# Patient Record
(demographics unavailable — no encounter records)

---

## 2024-10-14 NOTE — PHYSICAL EXAM
[General Appearance - Alert] : alert [General Appearance - In No Acute Distress] : in no acute distress [Sclera] : the sclera and conjunctiva were normal [Outer Ear] : the ears and nose were normal in appearance [Neck Appearance] : the appearance of the neck was normal [Neck Cervical Mass (___cm)] : no neck mass was observed [Jugular Venous Distention Increased] : there was no jugular-venous distention [] : no respiratory distress [Exaggerated Use Of Accessory Muscles For Inspiration] : no accessory muscle use [Apical Impulse] : the apical impulse was normal [Heart Rate And Rhythm] : heart rate was normal and rhythm regular [Heart Sounds] : normal S1 and S2 [Edema] : there was no peripheral edema [Bowel Sounds] : normal bowel sounds [Abdomen Soft] : soft [Abdomen Tenderness] : non-tender [Cervical Lymph Nodes Enlarged Posterior Bilaterally] : posterior cervical [Cervical Lymph Nodes Enlarged Anterior Bilaterally] : anterior cervical [Supraclavicular Lymph Nodes Enlarged Bilaterally] : supraclavicular [No CVA Tenderness] : no ~M costovertebral angle tenderness [No Spinal Tenderness] : no spinal tenderness [Abnormal Walk] : normal gait [Nail Clubbing] : no clubbing  or cyanosis of the fingernails [Musculoskeletal - Swelling] : no joint swelling seen [Skin Color & Pigmentation] : normal skin color and pigmentation [Oriented To Time, Place, And Person] : oriented to person, place, and time

## 2024-10-14 NOTE — END OF VISIT
Talked to patient she will be in to have lab sample redrawn.   
[Time Spent: ___ minutes] : I have spent [unfilled] minutes of time on the encounter which excludes teaching and separately reported services.
[Time Spent: ___ minutes] : I have spent [unfilled] minutes of time on the encounter which excludes teaching and separately reported services.

## 2024-10-17 NOTE — HISTORY OF PRESENT ILLNESS
[FreeTextEntry1] : A case of chronic kidney disease in the setting hypertension, diabetes mellitus, hepatitis, and right atrophied kidney with cyst in both the kidneys. Patient is being evaluated for CKD.

## 2024-10-17 NOTE — ASSESSMENT
[FreeTextEntry1] : A case of chronic kidney disease in the setting hypertension, diabetes mellitus, hepatitis, and right atrophied kidney with cyst in both the kidneys. Patient is being evaluated for CKD. Weight is stable. BP is controlled. No pedal edema. Renal duplex showed right atrophied kidney with bilateral simple cyst. Cause ol CKD likely nephrosclerosis related to ageing, hypertension and diabetes in addition to atrophied right kidney. However, other caused to be excluded. Advised serological and immunological w/u. Lab results were discussed with the patient. Renal functions were stable. A1C 6.3%. Serological and immunological markers are within an acceptable range. Patient is on an ACE inhibitor and a SGLT2 inhibitor. Advised to continue the present regimen. RTC one year.

## 2024-10-17 NOTE — REVIEW OF SYSTEMS
[Nocturia] : nocturia [Negative] : ENT [Recent Weight Gain (___ Lbs)] : no recent weight gain [Recent Weight Loss (___ Lbs)] : no recent weight loss [Chest Pain] : no chest pain [Palpitations] : no palpitations [Lower Ext Edema] : no extremity edema [SOB on Exertion] : no shortness of breath during exertion [Constipation] : no constipation [Diarrhea] : no diarrhea [Heartburn] : no heartburn [Joint Swelling] : no joint swelling [Joint Stiffness] : no joint stiffness [Dizziness] : no dizziness [Fainting] : no fainting [Anxiety] : no anxiety [Depression] : no depression [Muscle Weakness] : no muscle weakness [Easy Bleeding] : no tendency for easy bleeding [Easy Bruising] : no tendency for easy bruising

## 2024-11-23 NOTE — END OF VISIT
[] : Fellow [FreeTextEntry3] : 59 yo MSM with overweight BMI, hypertension, dyslipidemia, IDDM2, CAD (s/p stents x2 in 2016), PAD (s/p stents in 2/2024), chronic R kidney atrophy, metabolic dysfunction-associated steatotic liver disease (MASLD), and recent hospitalization at [facility] from 8/21/24-8/29/24 and then transferred to [facility] 8/29/24-9/3/24 due to a severe acute liver injury and hyperbilirubinemia due to acute HBV infection (vs less likely HBV reactivation/flare, with HBsAg+, HBcIgM+, HBeAg+, and peak HBV PCR 4,743,319 IU/mL on 8/24/24). No HIV, HCV, or HDV coinfection. Inpatient labs with weakly positive ASMA with 1:20 -> 1:40 titer unlikely of any clinical significance. Recent MRI abdomen (10/7/24) with no cirrhosis or HCC. As an inpatient, he was started on antiviral therapy initially with TDF and currently on entecavir 0.5 mg po daily. Most recent labs (10/7/24) with normalized liver chemistries and continued improvement in HBV PCR to 835 IU/mL. He will continue antiviral therapy and we will re-check labs and imaging in 6 months. Prior FibroScan (9/13/24) with median liver stiffness 11.1 kPa in range of advanced (F3) fibrosis but may be an overestimate of hepatic fibrosis due to the acute liver injury at the time and therefore we will plan to repeat it in 6 months with his next visit for re-staging, including to ascertain whether he would also benefit from targeted pharmacotherapy for MASLD in addition to ongoing HBV treatment. He is HAV immune. We discussed condom use given viremia.

## 2024-11-23 NOTE — HISTORY OF PRESENT ILLNESS
[FreeTextEntry1] : RFR: Post hospitalization visit, acute HBV  Referring MD: [facility]  Cardiologist: Dr. Byron Trevizo  Endo/PCP: Dr. Garay    Mr. Angie North is a 60y M who presents today to establish hepatology care following a hospitalization for new, acute HBV infection. Additional PMH of DM2 (~8.5% most recently), HTN, HLD, CAD (s/p PCI x2 2016), PAD -- (s/p angioplasty w/ stenting 2/2024 -- on ASA/plavix).   Pt has hx of vomiting a few years ago, for which he underwent an EGD and was found to have gastritis, which resolved w/ meds. Began having nausea/vomiting which started two days after recent travel to [place] 5/23/2024- 6/1/24. Pt is without remarkable source of exposure as he did not engage in sexual activity, consume raw seafood, or sustain a needlestick exposure. Continued to feel gradually unwell so presented to [facility] 8/21 w/ c/o N/V, dark urine & stools, found to have acute HBV - started on tenofovir but subsequently tx to [facility] 8/29/24 for further management d/t persistent transaminitis of AST/ALT >1000 & elevated TB. Admitted to [facility] 8/29/24 - 9/3/24 for continued monitoring in the event of HARRIET.   8/22/24 EGD @  (report unavailable): MW Tear noted   US ABD/ Doppler 9/3/24:  1. Hepatomegaly. The liver parenchyma demonstrates diffusely increased and heterogeneous echogenicity compatible with hepatic steatosis. The  hepatic vasculature is patent, as detailed above. 2. Status post cholecystectomy. No biliary dilatation. 3. Mild splenomegaly. ... also w/ incidental finding of R. kidney atrophy   Serologies 8/29: HBcIgM +, HBsAg +. HBeAg +, HB PCR 06965 (millions @ OSH) HAV IgG +, IgM - HCV nr  HDV IgM nr, total Ab neg  9/1/24 Quant IgG 1172, IgA 195, IgM + 390  CHOCO negative, SMA + 1:40, AMA neg < 1:20  AFP 10.5 **  Downtrending transaminases at time of DC  9/2/24: AST//1197; ALKP 482, TB 4 9/3/24: AST//1047; ALKP 447, TB 3.6, Plt 202   Outpt labs 9/10/24: AST/ALT 59/218; ALKP 273, TB 2.3, HepB PCR quant 920 (8/29/24 05698)   Of note, [facility] inpt notes state pt was started on TDF, however pt was started on entecavir upon tx to [facility] (unclear reason)  Remains compliant w/ entecavir 0.5 mg  Prefers med refills from [facility] as current  Had isolated episode of vomiting 9/6, but none since.   Today, denies n/v/d, fevers, chills, excessive fatigue, chest pain, SOB, palpitations, lightheadedness/ dizziness, melena, unintentional weight loss, acute hospitalizations or ER visits. Afebrile today. Appetite is good, eating 3 meals/daily, working on portion control.   [Other Medical Hx] See HPI   [Surgical Hx] Cholecystectomy 90s, sinus surgery for tumor removal 90s    [Allergies] NKDA   [Medications] ASA 81 mg  Entecavir 0.5 mg daily  Janumet 50mg -1000 mg BID  Jardiance 25 mg daily Glimeperide 4 mg BID   Newly initiated on insulin 15 units ~ 8/2024 daily  Lisinopril-hctz 20 mg-12.5 mg daily  Metoprolol 25 mg daily Plavix 75 mg daily    [FmH of liver disease] Denies    [Socia/l Hx] - Occupation:   - Alcohol: Previously social drinker, quit since 2016 after cardiac episode  - Tobacco: Ex- smoker 1/2 ppd, quit 2016  - Illicit drug: Denies     - Herb and dietary Supplement: Denies    [Procedures] EGD/COLONOSCOPY: 4 years ago w/ GI, was told of mild gastritis, but otherwise normal results, repeat in 5 years. Records unavailable   INTERVAL HISTORY  Nov 22, 2024 Patient is here for routine follow up after starting Entecavir 0.5mg daily. Overall, he feels well, having good energy levels, preserved appetite and regular bowel movements. Compliant with his medications and tolerating them well. Blood work from Oct 2024 was reviewed. Patient with preserved liver synthetic function and normalization of LFTs. HCV PCR neg I HBV DNA pos I HBsAb neg I HBsAg pos I CHOCO neg. MRI (10/9/2024) negative for HCC. Patient was scheduled for repeat Fibroscan today, but it was cancelled and we will monitor on next appointment.

## 2024-11-23 NOTE — HISTORY OF PRESENT ILLNESS
[FreeTextEntry1] : RFR: Post hospitalization visit, acute HBV  Referring MD: [facility]  Cardiologist: Dr. Byron Trevizo  Endo/PCP: Dr. Garay    Mr. Angie North is a 60y M who presents today to establish hepatology care following a hospitalization for new, acute HBV infection. Additional PMH of DM2 (~8.5% most recently), HTN, HLD, CAD (s/p PCI x2 2016), PAD -- (s/p angioplasty w/ stenting 2/2024 -- on ASA/plavix).   Pt has hx of vomiting a few years ago, for which he underwent an EGD and was found to have gastritis, which resolved w/ meds. Began having nausea/vomiting which started two days after recent travel to [place] 5/23/2024- 6/1/24. Pt is without remarkable source of exposure as he did not engage in sexual activity, consume raw seafood, or sustain a needlestick exposure. Continued to feel gradually unwell so presented to [facility] 8/21 w/ c/o N/V, dark urine & stools, found to have acute HBV - started on tenofovir but subsequently tx to [facility] 8/29/24 for further management d/t persistent transaminitis of AST/ALT >1000 & elevated TB. Admitted to [facility] 8/29/24 - 9/3/24 for continued monitoring in the event of HARRIET.   8/22/24 EGD @  (report unavailable): MW Tear noted   US ABD/ Doppler 9/3/24:  1. Hepatomegaly. The liver parenchyma demonstrates diffusely increased and heterogeneous echogenicity compatible with hepatic steatosis. The  hepatic vasculature is patent, as detailed above. 2. Status post cholecystectomy. No biliary dilatation. 3. Mild splenomegaly. ... also w/ incidental finding of R. kidney atrophy   Serologies 8/29: HBcIgM +, HBsAg +. HBeAg +, HB PCR 18628 (millions @ OSH) HAV IgG +, IgM - HCV nr  HDV IgM nr, total Ab neg  9/1/24 Quant IgG 1172, IgA 195, IgM + 390  CHOCO negative, SMA + 1:40, AMA neg < 1:20  AFP 10.5 **  Downtrending transaminases at time of DC  9/2/24: AST//1197; ALKP 482, TB 4 9/3/24: AST//1047; ALKP 447, TB 3.6, Plt 202   Outpt labs 9/10/24: AST/ALT 59/218; ALKP 273, TB 2.3, HepB PCR quant 920 (8/29/24 73042)   Of note, [facility] inpt notes state pt was started on TDF, however pt was started on entecavir upon tx to [facility] (unclear reason)  Remains compliant w/ entecavir 0.5 mg  Prefers med refills from [facility] as current  Had isolated episode of vomiting 9/6, but none since.   Today, denies n/v/d, fevers, chills, excessive fatigue, chest pain, SOB, palpitations, lightheadedness/ dizziness, melena, unintentional weight loss, acute hospitalizations or ER visits. Afebrile today. Appetite is good, eating 3 meals/daily, working on portion control.   [Other Medical Hx] See HPI   [Surgical Hx] Cholecystectomy 90s, sinus surgery for tumor removal 90s    [Allergies] NKDA   [Medications] ASA 81 mg  Entecavir 0.5 mg daily  Janumet 50mg -1000 mg BID  Jardiance 25 mg daily Glimeperide 4 mg BID   Newly initiated on insulin 15 units ~ 8/2024 daily  Lisinopril-hctz 20 mg-12.5 mg daily  Metoprolol 25 mg daily Plavix 75 mg daily    [FmH of liver disease] Denies    [Socia/l Hx] - Occupation:   - Alcohol: Previously social drinker, quit since 2016 after cardiac episode  - Tobacco: Ex- smoker 1/2 ppd, quit 2016  - Illicit drug: Denies     - Herb and dietary Supplement: Denies    [Procedures] EGD/COLONOSCOPY: 4 years ago w/ GI, was told of mild gastritis, but otherwise normal results, repeat in 5 years. Records unavailable   INTERVAL HISTORY  Nov 22, 2024 Patient is here for routine follow up after starting Entecavir 0.5mg daily. Overall, he feels well, having good energy levels, preserved appetite and regular bowel movements. Compliant with his medications and tolerating them well. Blood work from Oct 2024 was reviewed. Patient with preserved liver synthetic function and normalization of LFTs. HCV PCR neg I HBV DNA pos I HBsAb neg I HBsAg pos I CHOCO neg. MRI (10/9/2024) negative for HCC. Patient was scheduled for repeat Fibroscan today, but it was cancelled and we will monitor on next appointment.

## 2024-11-23 NOTE — ASSESSMENT
[FreeTextEntry1] : Mr. Angie North is a 60y M, post hospitalization for acute HBV of unclear etiology. Pt also likely w/ probable MASLD given + risk factors HTN, HLD, DM2, CAD hx of PCI on DAPT, PAD  # Acute HBV  - Compliant with Entecavir 0.5 mg daily. Well tolerated. - On blood work from Oct 2024, preserved liver synthetic function and normalization of LFTs.  - Fibroscan (9/13/24) inconclusive d/t current inflammatory process. [S1 - F3]   - MRI (10/9/2024) no cirrhotic morphology.  - Pt education provided on Hep B, including minimizing transmission risk. Encouraged pt to disclose acute HBV status and patient shares he has already shared this information with his loved ones.  - Pt counseled on his risk factors for MASH/MASLD and counseled on healthy lifestyle modifications including healthy diet and exercise with PCP/endocrinologist regular follow-up.  - Also counseled on avoiding alcohol, herbs and supplements.   # HCC Screening  - MRI (10/9/2024) negative for HCC. AFP (9/1/24) 10.5 - Next due in April 2025.  - Importance of HCC screening provided to patient.   [Plan] - Continue entecavir 0.5mg daily  - Repeat MELD labs, HBV DNA/VL, HBe Ag/Ab and AFP ordered  - Monitor Abdominal US for HCC screening  - Repeat Fibroscan to be done in next appt.  - RTO in 6 months after completion of the above

## 2024-11-23 NOTE — PHYSICAL EXAM
[Non-Tender] : non-tender [Smooth] : smooth [General Appearance - Alert] : alert [General Appearance - In No Acute Distress] : in no acute distress [General Appearance - Well Nourished] : well nourished [General Appearance - Well Developed] : well developed [General Appearance - Well-Appearing] : healthy appearing [Sclera] : the sclera and conjunctiva were normal [Hearing Threshold Finger Rub Not [place]] : hearing was normal [Oropharynx] : the oropharynx was normal [Neck Appearance] : the appearance of the neck was normal [Neck Cervical Mass (___cm)] : no neck mass was observed [Jugular Venous Distention Increased] : there was no jugular-venous distention [Respiration, Rhythm And Depth] : normal respiratory rhythm and effort [Exaggerated Use Of Accessory Muscles For Inspiration] : no accessory muscle use [Auscultation Breath Sounds / Voice Sounds] : lungs were clear to auscultation bilaterally [Heart Rate And Rhythm] : heart rate was normal and rhythm regular [Heart Sounds] : normal S1 and S2 [Murmurs] : no murmurs [Edema] : there was no peripheral edema [Bowel Sounds] : normal bowel sounds [Abdomen Soft] : soft [Abdomen Tenderness] : non-tender [Abdomen Mass (___ Cm)] : no abdominal mass palpated [Abnormal Walk] : normal gait [Nail Clubbing] : no clubbing  or cyanosis of the fingernails [Involuntary Movements] : no involuntary movements were seen [Skin Color & Pigmentation] : normal skin color and pigmentation [Skin Turgor] : normal skin turgor [] : no rash [No Focal Deficits] : no focal deficits [Oriented To Time, Place, And Person] : oriented to person, place, and time [Impaired Insight] : insight and judgment were intact [Affect] : the affect was normal [Mood] : the mood was normal [Memory Recent] : recent memory was not impaired [Memory Remote] : remote memory was not impaired [Scleral Icterus] : No Scleral Icterus [Spider Angioma] : No spider angioma(s) were observed [Abdominal  Ascites] : no ascites [Splenomegaly] : no splenomegaly [Caput Medusae] : no caput medusae observed [Asterixis] : no asterixis observed [Jaundice] : No jaundice [Palmar Erythema] : no Palmar Erythema

## 2024-11-23 NOTE — PHYSICAL EXAM
[Non-Tender] : non-tender [Smooth] : smooth [General Appearance - Alert] : alert [General Appearance - In No Acute Distress] : in no acute distress [General Appearance - Well Nourished] : well nourished [General Appearance - Well Developed] : well developed [General Appearance - Well-Appearing] : healthy appearing [Sclera] : the sclera and conjunctiva were normal [Hearing Threshold Finger Rub Not [place]] : hearing was normal [Oropharynx] : the oropharynx was normal [Neck Appearance] : the appearance of the neck was normal [Neck Cervical Mass (___cm)] : no neck mass was observed [Jugular Venous Distention Increased] : there was no jugular-venous distention [Respiration, Rhythm And Depth] : normal respiratory rhythm and effort [Exaggerated Use Of Accessory Muscles For Inspiration] : no accessory muscle use [Auscultation Breath Sounds / Voice Sounds] : lungs were clear to auscultation bilaterally [Heart Rate And Rhythm] : heart rate was normal and rhythm regular [Heart Sounds] : normal S1 and S2 [Murmurs] : no murmurs [Edema] : there was no peripheral edema [Bowel Sounds] : normal bowel sounds [Abdomen Soft] : soft [Abdomen Tenderness] : non-tender [Abdomen Mass (___ Cm)] : no abdominal mass palpated [Abnormal Walk] : normal gait [Nail Clubbing] : no clubbing  or cyanosis of the fingernails [Involuntary Movements] : no involuntary movements were seen [Skin Color & Pigmentation] : normal skin color and pigmentation [Skin Turgor] : normal skin turgor [] : no rash [No Focal Deficits] : no focal deficits [Oriented To Time, Place, And Person] : oriented to person, place, and time [Impaired Insight] : insight and judgment were intact [Affect] : the affect was normal [Mood] : the mood was normal [Memory Recent] : recent memory was not impaired [Memory Remote] : remote memory was not impaired [Scleral Icterus] : No Scleral Icterus [Spider Angioma] : No spider angioma(s) were observed [Splenomegaly] : no splenomegaly [Abdominal  Ascites] : no ascites [Caput Medusae] : no caput medusae observed [Asterixis] : no asterixis observed [Jaundice] : No jaundice [Palmar Erythema] : no Palmar Erythema

## 2024-11-23 NOTE — END OF VISIT
[] : Fellow [FreeTextEntry3] : 61 yo MSM with overweight BMI, hypertension, dyslipidemia, IDDM2, CAD (s/p stents x2 in 2016), PAD (s/p stents in 2/2024), chronic R kidney atrophy, metabolic dysfunction-associated steatotic liver disease (MASLD), and recent hospitalization at [facility] from 8/21/24-8/29/24 and then transferred to [facility] 8/29/24-9/3/24 due to a severe acute liver injury and hyperbilirubinemia due to acute HBV infection (vs less likely HBV reactivation/flare, with HBsAg+, HBcIgM+, HBeAg+, and peak HBV PCR 4,743,319 IU/mL on 8/24/24). No HIV, HCV, or HDV coinfection. Inpatient labs with weakly positive ASMA with 1:20 -> 1:40 titer unlikely of any clinical significance. Recent MRI abdomen (10/7/24) with no cirrhosis or HCC. As an inpatient, he was started on antiviral therapy initially with TDF and currently on entecavir 0.5 mg po daily. Most recent labs (10/7/24) with normalized liver chemistries and continued improvement in HBV PCR to 835 IU/mL. He will continue antiviral therapy and we will re-check labs and imaging in 6 months. Prior FibroScan (9/13/24) with median liver stiffness 11.1 kPa in range of advanced (F3) fibrosis but may be an overestimate of hepatic fibrosis due to the acute liver injury at the time and therefore we will plan to repeat it in 6 months with his next visit for re-staging, including to ascertain whether he would also benefit from targeted pharmacotherapy for MASLD in addition to ongoing HBV treatment. He is HAV immune. We discussed condom use given viremia.

## 2024-12-26 NOTE — PHYSICAL EXAM
[General Appearance - Alert] : alert [General Appearance - In No Acute Distress] : in no acute distress [Sclera] : the sclera and conjunctiva were normal [Outer Ear] : the ears and nose were normal in appearance [Neck Appearance] : the appearance of the neck was normal [Neck Cervical Mass (___cm)] : no neck mass was observed [Jugular Venous Distention Increased] : there was no jugular-venous distention [] : no respiratory distress [Exaggerated Use Of Accessory Muscles For Inspiration] : no accessory muscle use [Apical Impulse] : the apical impulse was normal [Heart Rate And Rhythm] : heart rate was normal and rhythm regular [Edema] : there was no peripheral edema [Heart Sounds] : normal S1 and S2 [Abdomen Soft] : soft [Bowel Sounds] : normal bowel sounds [Abdomen Tenderness] : non-tender [Cervical Lymph Nodes Enlarged Posterior Bilaterally] : posterior cervical [Supraclavicular Lymph Nodes Enlarged Bilaterally] : supraclavicular [Cervical Lymph Nodes Enlarged Anterior Bilaterally] : anterior cervical [No CVA Tenderness] : no ~M costovertebral angle tenderness [No Spinal Tenderness] : no spinal tenderness [Abnormal Walk] : normal gait [Nail Clubbing] : no clubbing  or cyanosis of the fingernails [Musculoskeletal - Swelling] : no joint swelling seen [Skin Color & Pigmentation] : normal skin color and pigmentation [Oriented To Time, Place, And Person] : oriented to person, place, and time

## 2024-12-26 NOTE — PHYSICAL EXAM
[General Appearance - Alert] : alert [General Appearance - In No Acute Distress] : in no acute distress [Sclera] : the sclera and conjunctiva were normal [Outer Ear] : the ears and nose were normal in appearance [Neck Appearance] : the appearance of the neck was normal [Neck Cervical Mass (___cm)] : no neck mass was observed [Jugular Venous Distention Increased] : there was no jugular-venous distention [] : no respiratory distress [Exaggerated Use Of Accessory Muscles For Inspiration] : no accessory muscle use [Apical Impulse] : the apical impulse was normal [Heart Rate And Rhythm] : heart rate was normal and rhythm regular [Edema] : there was no peripheral edema [Heart Sounds] : normal S1 and S2 [Bowel Sounds] : normal bowel sounds [Abdomen Soft] : soft [Abdomen Tenderness] : non-tender [Cervical Lymph Nodes Enlarged Posterior Bilaterally] : posterior cervical [Cervical Lymph Nodes Enlarged Anterior Bilaterally] : anterior cervical [Supraclavicular Lymph Nodes Enlarged Bilaterally] : supraclavicular [No CVA Tenderness] : no ~M costovertebral angle tenderness [No Spinal Tenderness] : no spinal tenderness [Abnormal Walk] : normal gait [Nail Clubbing] : no clubbing  or cyanosis of the fingernails [Musculoskeletal - Swelling] : no joint swelling seen [Skin Color & Pigmentation] : normal skin color and pigmentation [Oriented To Time, Place, And Person] : oriented to person, place, and time

## 2024-12-31 NOTE — ASSESSMENT
[FreeTextEntry1] : A case of chronic kidney disease in the setting hypertension, diabetes mellitus, hepatitis, and right atrophied kidney with cyst in both the kidneys. Patient is being evaluated for CKD. Weight is stable. BP is controlled. No pedal edema. Renal duplex showed right atrophied kidney with bilateral simple cyst. Cause ol CKD likely nephrosclerosis related to ageing, hypertension and diabetes in addition to atrophied right kidney. However, other caused to be excluded. Advised serological and immunological w/u. Lab results were discussed with the patient. Renal functions were stable. A1C 6.3%. Serological and immunological markers are within an acceptable range. Patient is on an ACE inhibitor and a SGLT2 inhibitor. Advised to continue the present regimen. RTC one year. December 26, 2024 Patient was admitted to [facility] on December 8. 2024 for UTI sepsis and was discharged on Dec 14th, 2024. He has come for follow-up. Patient was also started on Eliquis for AF. Patient has lost 10 lbs. BP is on lower side. No pedal edema. Advised renal panel, CBC, A1C, urine analysis and urine culture.  Lab results were discussed with the patient. Renal functions were stable. A1c 8.5% may be partly contributed by uncontrolled sugar during UTI-sepsis episode. Patient says now sugar is better controlled.  Urine culture has not grown any bacteria.

## 2024-12-31 NOTE — REVIEW OF SYSTEMS
[Nocturia] : nocturia [Negative] : ENT [Recent Weight Loss (___ Lbs)] : recent [unfilled] ~Ulb weight loss [Recent Weight Gain (___ Lbs)] : no recent weight gain [Chest Pain] : no chest pain [Palpitations] : no palpitations [Lower Ext Edema] : no extremity edema [SOB on Exertion] : no shortness of breath during exertion [Constipation] : no constipation [Diarrhea] : no diarrhea [Heartburn] : no heartburn [Joint Swelling] : no joint swelling [Joint Stiffness] : no joint stiffness [Dizziness] : no dizziness [Fainting] : no fainting [Anxiety] : no anxiety [Depression] : no depression [Muscle Weakness] : no muscle weakness [Easy Bleeding] : no tendency for easy bleeding [Easy Bruising] : no tendency for easy bruising

## 2024-12-31 NOTE — ASSESSMENT
[FreeTextEntry1] : A case of chronic kidney disease in the setting hypertension, diabetes mellitus, hepatitis, and right atrophied kidney with cyst in both the kidneys. Patient is being evaluated for CKD. Weight is stable. BP is controlled. No pedal edema. Renal duplex showed right atrophied kidney with bilateral simple cyst. Cause ol CKD likely nephrosclerosis related to ageing, hypertension and diabetes in addition to atrophied right kidney. However, other caused to be excluded. Advised serological and immunological w/u. Lab results were discussed with the patient. Renal functions were stable. A1C 6.3%. Serological and immunological markers are within an acceptable range. Patient is on an ACE inhibitor and a SGLT2 inhibitor. Advised to continue the present regimen. RTC one year. December 26, 2024 Patient was admitted to [facility] on December 8. 2024 for UTI sepsis and was discharged on Dec 14th, 2024. He has come for follow-up. Patient was also started on Eliquis for AF. Patient has lost 10 lbs. BP is on lower side. No pedal edema. Advised renal panel, CBC, A1C, urine analysis and urine culture.  Lab results were discussed with the patient. Renal functions were stable. A1c 8.5% may be partly contributed by uncontrolled sugar during UTI-sepsis episode. Patient says now sugar is better controlled.  Urine culture has not grown any bacteria.

## 2024-12-31 NOTE — HISTORY OF PRESENT ILLNESS
[FreeTextEntry1] : A case of chronic kidney disease in the setting hypertension, diabetes mellitus, hepatitis, and right atrophied kidney with cyst in both the kidneys. Patient is being evaluated for CKD. Weight is stable. BP is controlled. No pedal edema. Renal duplex showed right atrophied kidney with bilateral simple cyst. Cause ol CKD likely nephrosclerosis related to ageing, hypertension and diabetes in addition to atrophied right kidney. However, other caused to be excluded. Advised serological and immunological w/u. Lab results were discussed with the patient. Renal functions were stable. A1C 6.3%. Serological and immunological markers are within an acceptable range. Patient is on an ACE inhibitor and a SGLT2 inhibitor. Advised to continue the present regimen. RTC one year. December 26, 2024 Patient was admitted to [facility] on December 8. 2024 for UTI sepsis and was discharged on Dec 14th, 2024. He has come for follow-up.

## 2024-12-31 NOTE — HISTORY OF PRESENT ILLNESS
[FreeTextEntry1] : A case of chronic kidney disease in the setting hypertension, diabetes mellitus, hepatitis, and right atrophied kidney with cyst in both the kidneys. Patient is being evaluated for CKD. Weight is stable. BP is controlled. No pedal edema. Renal duplex showed right atrophied kidney with bilateral simple cyst. Cause ol CKD likely nephrosclerosis related to ageing, hypertension and diabetes in addition to atrophied right kidney. However, other caused to be excluded. Advised serological and immunological w/u. Lab results were discussed with the patient. Renal functions were stable. A1C 6.3%. Serological and immunological markers are within an acceptable range. Patient is on an ACE inhibitor and a SGLT2 inhibitor. Advised to continue the present regimen. RTC one year. December 26, 2024 Patient was admitted to [facility] on December 8. 2024 for UTI sepsis and was discharged on Dec 14th, 2024. He has come for follow-up.

## 2024-12-31 NOTE — HISTORY OF PRESENT ILLNESS
[FreeTextEntry1] : A case of chronic kidney disease in the setting hypertension, diabetes mellitus, hepatitis, and right atrophied kidney with cyst in both the kidneys. Patient is being evaluated for CKD. Weight is stable. BP is controlled. No pedal edema. Renal duplex showed right atrophied kidney with bilateral simple cyst. Cause ol CKD likely nephrosclerosis related to ageing, hypertension and diabetes in addition to atrophied right kidney. However, other caused to be excluded. Advised serological and immunological w/u. Lab results were discussed with the patient. Renal functions were stable. A1C 6.3%. Serological and immunological markers are within an acceptable range. Patient is on an ACE inhibitor and a SGLT2 inhibitor. Advised to continue the present regimen. RTC one year. December 26, 2024 Patient was admitted to [facility] on December 8. 2024 for UTI sepsis and was discharged on Dec 14th, 2024. He has come for follow-up.

## 2024-12-31 NOTE — ASSESSMENT
[FreeTextEntry1] : A case of chronic kidney disease in the setting hypertension, diabetes mellitus, hepatitis, and right atrophied kidney with cyst in both the kidneys. Patient is being evaluated for CKD. Weight is stable. BP is controlled. No pedal edema. Renal duplex showed right atrophied kidney with bilateral simple cyst. Cause ol CKD likely nephrosclerosis related to ageing, hypertension and diabetes in addition to atrophied right kidney. However, other caused to be excluded. Advised serological and immunological w/u. Lab results were discussed with the patient. Renal functions were stable. A1C 6.3%. Serological and immunological markers are within an acceptable range. Patient is on an ACE inhibitor and a SGLT2 inhibitor. Advised to continue the present regimen. RTC one year. December 26, 2024 Patient was admitted to [facility] on December 8. 2024 for UTI sepsis and was discharged on Dec 14th, 2024. He has come for follow-up. Patient was also started on Eliquis for AF. Patient has lost 10 lbs. BP is on lower side. No pedal edema. Advised renal panel, CBC, A1C, urine analysis and urine culture.  Lab results were discussed with the patient. Renal functions were stable. A1c 8.5% may be partly contributed by uncontrolled sugar during UTI-sepsis episode. Patient says now sugar is better controlled.  Urine culture has not grown any bacteria.

## 2025-03-25 NOTE — ASSESSMENT
[FreeTextEntry1] : A case of chronic kidney disease in the setting hypertension, diabetes mellitus, hepatitis, and right atrophied kidney with cyst in both the kidneys. Patient is being evaluated for CKD. Weight is stable. BP is controlled. No pedal edema. Renal duplex showed right atrophied kidney with bilateral simple cyst. Cause ol CKD likely nephrosclerosis related to ageing, hypertension and diabetes in addition to atrophied right kidney. However, other caused to be excluded. Advised serological and immunological w/u. Lab results were discussed with the patient. Renal functions were stable. A1C 6.3%. Serological and immunological markers are within an acceptable range. Patient is on an ACE inhibitor and a SGLT2 inhibitor. Advised to continue the present regimen. RTC one year. December 26, 2024 Patient was admitted to [facility] on December 8. 2024 for UTI sepsis and was discharged on Dec 14th, 2024. He has come for follow-up. Patient was also started on Eliquis for AF. Patient has lost 10 lbs. BP is on lower side. No pedal edema. Advised renal panel, CBC, A1C, urine analysis and urine culture. Lab results were discussed with the patient. Renal functions were stable. A1c 8.5% may be partly contributed by uncontrolled sugar during UTI-sepsis episode. Patient says now sugar is better controlled. Urine culture has not grown any bacteria. March 24, 2025 Patient feels comfortable. Patient has gained 11 lbs. BP is controlled. No pedal edema. Advised renal panle, A1C and urine analysis.  Lab results were discussed with the patient. Renal function were stable. Blood sugar 203 mg/dl and A1C 8%; advised better control of blood sugar. Urine analysis was normal. RTC one year.

## 2025-03-25 NOTE — HISTORY OF PRESENT ILLNESS
[FreeTextEntry1] : A case of chronic kidney disease in the setting hypertension, diabetes mellitus, hepatitis, and right atrophied kidney with cyst in both the kidneys. Patient is being evaluated for CKD. Weight is stable. BP is controlled. No pedal edema. Renal duplex showed right atrophied kidney with bilateral simple cyst. Cause ol CKD likely nephrosclerosis related to ageing, hypertension and diabetes in addition to atrophied right kidney. However, other caused to be excluded. Advised serological and immunological w/u. Lab results were discussed with the patient. Renal functions were stable. A1C 6.3%. Serological and immunological markers are within an acceptable range. Patient is on an ACE inhibitor and a SGLT2 inhibitor. Advised to continue the present regimen. RTC one year. December 26, 2024 Patient was admitted to [facility] on December 8. 2024 for UTI sepsis and was discharged on Dec 14th, 2024. He has come for follow-up. Patient was also started on Eliquis for AF. Patient has lost 10 lbs. BP is on lower side. No pedal edema. Advised renal panel, CBC, A1C, urine analysis and urine culture. Lab results were discussed with the patient. Renal functions were stable. A1c 8.5% may be partly contributed by uncontrolled sugar during UTI-sepsis episode. Patient says now sugar is better controlled. Urine culture has not grown any bacteria. March 24, 2025 Patient feels comfortable.

## 2025-03-25 NOTE — REVIEW OF SYSTEMS
[Recent Weight Gain (___ Lbs)] : recent [unfilled] ~Ulb weight gain [Nocturia] : nocturia [Negative] : ENT [Recent Weight Loss (___ Lbs)] : no recent weight loss [Chest Pain] : no chest pain [Palpitations] : no palpitations [Lower Ext Edema] : no extremity edema [SOB on Exertion] : no shortness of breath during exertion [Constipation] : no constipation [Diarrhea] : no diarrhea [Heartburn] : no heartburn [Joint Swelling] : no joint swelling [Joint Stiffness] : no joint stiffness [Dizziness] : no dizziness [Fainting] : no fainting [Anxiety] : no anxiety [Depression] : no depression [Muscle Weakness] : no muscle weakness [Easy Bleeding] : no tendency for easy bleeding [Easy Bruising] : no tendency for easy bruising

## 2025-05-29 NOTE — PHYSICAL EXAM
[Non-Tender] : non-tender [Smooth] : smooth [General Appearance - Alert] : alert [General Appearance - In No Acute Distress] : in no acute distress [General Appearance - Well Nourished] : well nourished [General Appearance - Well Developed] : well developed [General Appearance - Well-Appearing] : healthy appearing [Sclera] : the sclera and conjunctiva were normal [Hearing Threshold Finger Rub Not [place]] : hearing was normal [Oropharynx] : the oropharynx was normal [Neck Appearance] : the appearance of the neck was normal [Neck Cervical Mass (___cm)] : no neck mass was observed [Jugular Venous Distention Increased] : there was no jugular-venous distention [Respiration, Rhythm And Depth] : normal respiratory rhythm and effort [Exaggerated Use Of Accessory Muscles For Inspiration] : no accessory muscle use [Auscultation Breath Sounds / Voice Sounds] : lungs were clear to auscultation bilaterally [Heart Rate And Rhythm] : heart rate was normal and rhythm regular [Heart Sounds] : normal S1 and S2 [Murmurs] : no murmurs [Edema] : there was no peripheral edema [Bowel Sounds] : normal bowel sounds [Abdomen Soft] : soft [Abdomen Tenderness] : non-tender [Abdomen Mass (___ Cm)] : no abdominal mass palpated [Abnormal Walk] : normal gait [Nail Clubbing] : no clubbing  or cyanosis of the fingernails [Involuntary Movements] : no involuntary movements were seen [Skin Color & Pigmentation] : normal skin color and pigmentation [Skin Turgor] : normal skin turgor [] : no rash [No Focal Deficits] : no focal deficits [Oriented To Time, Place, And Person] : oriented to person, place, and time [Impaired Insight] : insight and judgment were intact [Affect] : the affect was normal [Mood] : the mood was normal [Memory Recent] : recent memory was not impaired [Memory Remote] : remote memory was not impaired [Scleral Icterus] : No Scleral Icterus [Spider Angioma] : No spider angioma(s) were observed [Abdominal  Ascites] : no ascites [Splenomegaly] : no splenomegaly [Caput Medusae] : no caput medusae observed [Asterixis] : no asterixis observed [Jaundice] : No jaundice [Palmar Erythema] : no Palmar Erythema

## 2025-05-29 NOTE — HISTORY OF PRESENT ILLNESS
[FreeTextEntry1] : Mr. 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 No HIV, HCV, or HDV coinfection. He is now on antiviral therapy with clinical improvement including loss of HBeAg and development of HBeAb as well as suppressed viremia (with most recent HBV PCR detected but <10 IU/mL on 4/1/25).  PCP / Endocrinology: Dr. Vimal Garay Cardiology: Dr. Byron Trevizo  FibroScan (9/13/24): Median liver stiffness 11.1 kPa (concerning for stage 3 fibrosis but possibly overestimated due to resolving acute liver injury) and CAP score 248 dB/m (borderline, suggestive of S0-1 steatosis)  He was last seen in this office on 11/22/24 and is here today for routine follow-up. He reports feeling well. His endocrinologist recently started him on Ozempic 1 month ago, currently 0.5 mg subcutaneously weekly. He has been trying to eat a healthy diet and exercise regularly. No alcohol consumption. He reports daily adherence with his entecavir with no side effects noted. He last underwent EGD and colonoscopy ~4 years ago but is unsure of the results.  He has no known family history of liver disease. He has a family history of colon cancer.  He is a former smoker (1/2 ppd, quit in 2016). He previously drank alcohol socially but quit in 2016 after he was diagnosed with CAD. No history of recreational drug use. He works as a  but plans to retire once he turns 62 years old (in 5/2026). He lives with his brother. Never . No children.

## 2025-05-29 NOTE — ASSESSMENT
[FreeTextEntry1] : # Chronic hepatitis B after acute HBV infection (8/2024): - He was previously hospitalized at [facility] from 8/21/24-8/29/24 and then transferred to [facility] 8/29/24-9/3/24 due to a severe acute liver injury and hyperbilirubinemia due to acute HBV infection (with HBsAg+, HBcIgM+, HBeAg+, and peak HBV PCR 4,743,319 IU/mL on 8/24/24). No HIV, HCV, or HDV coinfection. - He is now on antiviral therapy, initially on TDF and now on entecavir 0.5 mg po daily which was refilled today. - Based on his most recent labs (4/1/25), he has had seroconversion with loss of HBeAg (now negative) and development of HBeAb (now reactive). His most recent HBV PCR was detected but <10 IU/mL (4/1/25). - Re-check labs including HBeAg, HBeAb, HBsAg, HBsAb, HBV PCR, liver chemistries, and AFP every 6 months, next in 10/2025 and ordered today. - Prior FibroScan (9/13/24) was likely overestimated due to the resolving acute liver injury, with plan for repeat elastography as below. - Recent ultrasound (4/1/25) with ill-defined areas of possible focal fatty sparing, but with plan for MRI abdomen with IV contrast as ordered today (with MR elastography as below) to confirm that these are benign focal fat only. Assuming the MRI is reassuring, we will continue q6 month screening for HCC with ultrasound next due in 12/2025 and ordered today and AFP every 6 months with labs. - I recommended that his family, particularly all first-degree relatives, and any current or past sexual partners be evaluated for hepatitis B as appropriate and vaccinated if non-immune. We discussed consistent condom use given viremia. - We discussed the importance of 100% medication adherence without any treatment gaps to prevent reactivation/flare and complications as these can be life-threatening. - Urinalysis and phosphorus ordered with next labs to assess for entecavir nephrotoxicity. DEXA ordered today to screen for osteopenia or osteoporosis. If he develops bone disease or nephrotoxicity, he may be eligible for a switch from entecavir to TAF.  # Metabolic dysfunction-associated steatotic liver disease (MASLD): - Although his recent labs (4/1/25) show interval normalization of his liver chemistries, recent ultrasound (4/1/25) shows hepatomegaly and hepatic steatosis consistent with MASLD, also with ill-defined areas of possible focal fatty sparing, as above, with plan for further characterization of these areas with MRI abdomen. - Prior FibroScan (9/13/24) was worrisome for possible stage 3 fibrosis but more likely was an overestimate of his fibrosis stage due to the resolving acute liver injury at the time. MR elastography ordered today for repeat non-invasive staging as well as quantification of steatosis and to characterize the areas of hepatic parenchyma noted on the ultrasound. - Inpatient labs showed weakly positive ASMA with 1:20 -> 1:40 titer unlikely of any clinical significance given that he no longer has AST or ALT elevations. - We discussed the importance of gradual weight loss, Mediterranean style diabetic friendly diet, and regular exercise, as well as continued alcohol abstinence. We also discussed that Ozempic (semaglutide) which his endocrinologist recently started for him may help with MASLD regression. We will discuss Rezdiffra (resmetirom) if indicated based on his MR elastography results.  # Health maintenance: - He is HAV immune. - Mr. RODRÍGUEZ was counseled to: abstain from alcohol; avoid use of herbal and dietary supplements due to potential hepatotoxicity; and limit use of acetaminophen to <2 grams per day.   Next follow-up: 6 months

## 2025-06-11 NOTE — HISTORY OF PRESENT ILLNESS
[FreeTextEntry1] : Patient presents after 2 years. He is voiding with an adequate stream, nocturia x 2, no dysuria or hematuria. He reports adequate erectile function. PSA on 4/10/23 was 2.21 ng/mL, with a favorable 4K score of 4.1%. PSA on 4/11/25 was elevated to 4.10ng/ml.

## 2025-06-11 NOTE — LETTER BODY
[Dear  ___] : Dear  [unfilled], [Please see my note below.] : Please see my note below. [Consult Closing:] : Thank you very much for allowing me to participate in the care of this patient.  If you have any questions, please do not hesitate to contact me. [Sincerely,] : Sincerely, [Courtesy Letter:] : I had the pleasure of seeing your patient, [unfilled], in my office today. [FreeTextEntry3] : [place]

## 2025-06-11 NOTE — ADDENDUM
[FreeTextEntry1] : Next visit: uroflow, PVR, PSA, ANITRA  Entered by Darshana Wayne, acting as scribe and chaperone for Dr. Milan Rivera.   The documentation recorded by the scribe accurately reflects the service I personally performed and the decisions made by me.

## 2025-06-11 NOTE — ASSESSMENT
[FreeTextEntry1] : Patient is stable clinically; he is voiding with adequate bladder emptying. He has minimal voiding symptoms and adequate bladder emptying so we will treat his BPH conservatively. His PSA elevation is likely secondary to BPH; however, a malignancy must be rule out. His serum was sent for PSA/4K determination. If his labs are favorable, he will follow-up in 1 year, sooner if needed. We discussed the importance of routine follow-ups.

## 2025-06-11 NOTE — PHYSICAL EXAM
[Normal Appearance] : normal appearance [Well Groomed] : well groomed [General Appearance - In No Acute Distress] : no acute distress [Edema] : no peripheral edema [Respiration, Rhythm And Depth] : normal respiratory rhythm and effort [Abdomen Soft] : soft [Exaggerated Use Of Accessory Muscles For Inspiration] : no accessory muscle use [Abdomen Tenderness] : non-tender [Costovertebral Angle Tenderness] : no ~M costovertebral angle tenderness [Urinary Bladder Findings] : the bladder was normal on palpation [Normal Station and Gait] : the gait and station were normal for the patient's age [No Focal Deficits] : no focal deficits [] : no rash [Oriented To Time, Place, And Person] : oriented to person, place, and time [Affect] : the affect was normal [Mood] : the mood was normal [No Palpable Adenopathy] : no palpable adenopathy [Penis Abnormality] : normal uncircumcised penis [Prostate Tenderness] : the prostate was not tender [Prostate Size ___ (0-4)] : prostate size [unfilled] (scale: 0-4) [No Prostate Nodules] : no prostate nodules [Urethral Meatus] : meatus normal [Scrotum] : the scrotum was normal